# Patient Record
Sex: MALE
[De-identification: names, ages, dates, MRNs, and addresses within clinical notes are randomized per-mention and may not be internally consistent; named-entity substitution may affect disease eponyms.]

---

## 2019-04-01 ENCOUNTER — HOSPITAL ENCOUNTER (OUTPATIENT)
Dept: HOSPITAL 90 - SHCH | Age: 74
Discharge: HOME | End: 2019-04-01
Attending: INTERNAL MEDICINE
Payer: MEDICARE

## 2019-04-01 DIAGNOSIS — I34.0: Primary | ICD-10-CM

## 2019-04-01 DIAGNOSIS — I48.0: ICD-10-CM

## 2019-04-01 DIAGNOSIS — I48.92: ICD-10-CM

## 2019-04-01 PROCEDURE — 93306 TTE W/DOPPLER COMPLETE: CPT

## 2019-07-26 ENCOUNTER — HOSPITAL ENCOUNTER (OUTPATIENT)
Dept: HOSPITAL 90 - SHCH | Age: 74
Discharge: HOME | End: 2019-07-26
Attending: INTERNAL MEDICINE
Payer: MEDICARE

## 2019-07-26 DIAGNOSIS — I48.0: ICD-10-CM

## 2019-07-26 DIAGNOSIS — I08.1: Primary | ICD-10-CM

## 2019-07-26 PROCEDURE — 93306 TTE W/DOPPLER COMPLETE: CPT

## 2019-08-27 ENCOUNTER — HOSPITAL ENCOUNTER (OUTPATIENT)
Dept: HOSPITAL 90 - SHCH | Age: 74
Discharge: HOME | End: 2019-08-27
Attending: INTERNAL MEDICINE
Payer: MEDICARE

## 2019-08-27 VITALS — BODY MASS INDEX: 14.81 KG/M2 | WEIGHT: 100 LBS | HEIGHT: 69 IN

## 2019-08-27 DIAGNOSIS — I21.29: Primary | ICD-10-CM

## 2019-08-27 DIAGNOSIS — I25.10: ICD-10-CM

## 2019-08-27 PROCEDURE — 93017 CV STRESS TEST TRACING ONLY: CPT

## 2019-08-27 PROCEDURE — 78452 HT MUSCLE IMAGE SPECT MULT: CPT

## 2019-08-27 PROCEDURE — 96374 THER/PROPH/DIAG INJ IV PUSH: CPT

## 2019-09-18 ENCOUNTER — HOSPITAL ENCOUNTER (INPATIENT)
Dept: HOSPITAL 90 - DAH | Age: 74
LOS: 3 days | Discharge: HOME | End: 2019-09-21
Payer: MEDICARE

## 2019-09-18 DIAGNOSIS — J95.811: ICD-10-CM

## 2019-09-18 DIAGNOSIS — I25.5: ICD-10-CM

## 2019-09-18 DIAGNOSIS — Z95.810: ICD-10-CM

## 2019-09-18 DIAGNOSIS — I11.0: ICD-10-CM

## 2019-09-18 DIAGNOSIS — I50.22: Primary | ICD-10-CM

## 2019-09-18 DIAGNOSIS — Z79.01: ICD-10-CM

## 2019-09-18 DIAGNOSIS — J93.0: ICD-10-CM

## 2019-09-18 DIAGNOSIS — R64: ICD-10-CM

## 2019-09-18 DIAGNOSIS — J44.9: ICD-10-CM

## 2019-09-23 ENCOUNTER — HOSPITAL ENCOUNTER (INPATIENT)
Dept: HOSPITAL 90 - EDH | Age: 74
LOS: 7 days | Discharge: HOME | DRG: 199 | End: 2019-09-30
Attending: INTERNAL MEDICINE | Admitting: INTERNAL MEDICINE
Payer: MEDICARE

## 2019-09-23 VITALS — WEIGHT: 105.1 LBS | BODY MASS INDEX: 15.93 KG/M2 | HEIGHT: 68 IN

## 2019-09-23 DIAGNOSIS — I50.20: ICD-10-CM

## 2019-09-23 DIAGNOSIS — F17.200: ICD-10-CM

## 2019-09-23 DIAGNOSIS — J93.0: Primary | ICD-10-CM

## 2019-09-23 DIAGNOSIS — Z87.442: ICD-10-CM

## 2019-09-23 DIAGNOSIS — I95.9: ICD-10-CM

## 2019-09-23 DIAGNOSIS — E78.5: ICD-10-CM
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
## 2019-09-23 DIAGNOSIS — I49.5: ICD-10-CM

## 2019-09-23 DIAGNOSIS — I11.0: ICD-10-CM

## 2019-09-23 DIAGNOSIS — E43: ICD-10-CM

## 2019-09-23 DIAGNOSIS — Z95.0: ICD-10-CM

## 2019-09-23 DIAGNOSIS — I25.2: ICD-10-CM

## 2019-09-23 LAB
ALBUMIN SERPL-MCNC: 3.7 G/DL (ref 3.5–5)
ALT SERPL-CCNC: 22 U/L (ref 12–78)
APTT PPP: 28.2 SEC (ref 26.3–35.5)
AST SERPL-CCNC: 30 U/L (ref 10–37)
BASOPHILS NFR BLD AUTO: 1.3 % (ref 0–5)
BILIRUB SERPL-MCNC: 0.7 MG/DL (ref 0.2–1)
BNP SERPL-MCNC: 121 PG/ML (ref 0–100)
BUN SERPL-MCNC: 20 MG/DL (ref 7–18)
CHLORIDE SERPL-SCNC: 102 MMOL/L (ref 101–111)
CK SERPL-CCNC: 65 U/L (ref 21–232)
CO2 SERPL-SCNC: 31 MMOL/L (ref 21–32)
CREAT SERPL-MCNC: 1 MG/DL (ref 0.5–1.5)
EOSINOPHIL NFR BLD AUTO: 4.8 % (ref 0–8)
ERYTHROCYTE [DISTWIDTH] IN BLOOD BY AUTOMATED COUNT: 13.9 % (ref 11–15.5)
GFR SERPL CREATININE-BSD FRML MDRD: 78 ML/MIN (ref 60–?)
GLUCOSE SERPL-MCNC: 102 MG/DL (ref 70–105)
HCT VFR BLD AUTO: 42 % (ref 42–54)
INR PPP: 1.02 (ref 0.85–1.15)
LYMPHOCYTES NFR SPEC AUTO: 21.1 % (ref 21–51)
MCH RBC QN AUTO: 32.7 PG (ref 27–33)
MCHC RBC AUTO-ENTMCNC: 33.8 G/DL (ref 32–36)
MCV RBC AUTO: 96.7 FL (ref 79–99)
MONOCYTES NFR BLD AUTO: 7.5 % (ref 3–13)
NEUTROPHILS NFR BLD AUTO: 65.3 % (ref 40–77)
NRBC BLD MANUAL-RTO: 0 % (ref 0–0.19)
PLATELET # BLD AUTO: 124 K/UL (ref 130–400)
POTASSIUM SERPL-SCNC: 3.7 MMOL/L (ref 3.5–5.1)
PROT SERPL-MCNC: 7 G/DL (ref 6–8.3)
PROTHROMBIN TIME: 10.7 SEC (ref 9.6–11.6)
RBC # BLD AUTO: 4.35 MIL/UL (ref 4.5–6.2)
SODIUM SERPL-SCNC: 143 MMOL/L (ref 136–145)
WBC # BLD AUTO: 10 K/UL (ref 4.8–10.8)

## 2019-09-23 PROCEDURE — 94664 DEMO&/EVAL PT USE INHALER: CPT

## 2019-09-23 PROCEDURE — 85610 PROTHROMBIN TIME: CPT

## 2019-09-23 PROCEDURE — 85730 THROMBOPLASTIN TIME PARTIAL: CPT

## 2019-09-23 PROCEDURE — 82550 ASSAY OF CK (CPK): CPT

## 2019-09-23 PROCEDURE — 85027 COMPLETE CBC AUTOMATED: CPT

## 2019-09-23 PROCEDURE — 83880 ASSAY OF NATRIURETIC PEPTIDE: CPT

## 2019-09-23 PROCEDURE — 71045 X-RAY EXAM CHEST 1 VIEW: CPT

## 2019-09-23 PROCEDURE — 80053 COMPREHEN METABOLIC PANEL: CPT

## 2019-09-23 PROCEDURE — 94640 AIRWAY INHALATION TREATMENT: CPT

## 2019-09-23 PROCEDURE — 36415 COLL VENOUS BLD VENIPUNCTURE: CPT

## 2019-09-23 PROCEDURE — 97039 UNLISTED MODALITY: CPT

## 2019-09-23 PROCEDURE — 84484 ASSAY OF TROPONIN QUANT: CPT

## 2019-09-23 PROCEDURE — 85025 COMPLETE CBC W/AUTO DIFF WBC: CPT

## 2019-09-23 PROCEDURE — 80048 BASIC METABOLIC PNL TOTAL CA: CPT

## 2019-09-23 PROCEDURE — 71250 CT THORAX DX C-: CPT

## 2019-09-23 PROCEDURE — 93005 ELECTROCARDIOGRAM TRACING: CPT

## 2019-09-23 PROCEDURE — 94760 N-INVAS EAR/PLS OXIMETRY 1: CPT

## 2019-09-24 LAB
BUN SERPL-MCNC: 20 MG/DL (ref 7–18)
CHLORIDE SERPL-SCNC: 108 MMOL/L (ref 101–111)
CO2 SERPL-SCNC: 29 MMOL/L (ref 21–32)
CREAT SERPL-MCNC: 0.8 MG/DL (ref 0.5–1.5)
GFR SERPL CREATININE-BSD FRML MDRD: 100 ML/MIN (ref 60–?)
GLUCOSE SERPL-MCNC: 116 MG/DL (ref 70–105)
POTASSIUM SERPL-SCNC: 4 MMOL/L (ref 3.5–5.1)
SODIUM SERPL-SCNC: 145 MMOL/L (ref 136–145)

## 2019-09-24 RX ADMIN — IPRATROPIUM BROMIDE AND ALBUTEROL SULFATE SCH UDVIAL: .5; 3 SOLUTION RESPIRATORY (INHALATION) at 18:45

## 2019-09-24 RX ADMIN — IPRATROPIUM BROMIDE AND ALBUTEROL SULFATE SCH UDVIAL: .5; 3 SOLUTION RESPIRATORY (INHALATION) at 21:32

## 2019-09-24 RX ADMIN — IPRATROPIUM BROMIDE AND ALBUTEROL SULFATE SCH UDVIAL: .5; 3 SOLUTION RESPIRATORY (INHALATION) at 15:03

## 2019-09-24 RX ADMIN — SODIUM CHLORIDE SCH GM: 9 INJECTION, SOLUTION INTRAVENOUS at 04:00

## 2019-09-24 RX ADMIN — APIXABAN SCH MG: 5 TABLET, FILM COATED ORAL at 21:00

## 2019-09-24 RX ADMIN — CARVEDILOL SCH MG: 3.12 TABLET, FILM COATED ORAL at 21:00

## 2019-09-24 RX ADMIN — CHLORTHALIDONE SCH EACH: 50 TABLET ORAL at 21:00

## 2019-09-24 RX ADMIN — FAMOTIDINE SCH MG: 20 TABLET, FILM COATED ORAL at 21:00

## 2019-09-24 RX ADMIN — ATORVASTATIN CALCIUM SCH MG: 20 TABLET, FILM COATED ORAL at 21:00

## 2019-09-25 VITALS — DIASTOLIC BLOOD PRESSURE: 53 MMHG | SYSTOLIC BLOOD PRESSURE: 100 MMHG

## 2019-09-25 VITALS — SYSTOLIC BLOOD PRESSURE: 103 MMHG | DIASTOLIC BLOOD PRESSURE: 52 MMHG

## 2019-09-25 VITALS — DIASTOLIC BLOOD PRESSURE: 59 MMHG | SYSTOLIC BLOOD PRESSURE: 82 MMHG

## 2019-09-25 VITALS — SYSTOLIC BLOOD PRESSURE: 81 MMHG | DIASTOLIC BLOOD PRESSURE: 51 MMHG

## 2019-09-25 VITALS — DIASTOLIC BLOOD PRESSURE: 54 MMHG | SYSTOLIC BLOOD PRESSURE: 84 MMHG

## 2019-09-25 VITALS — DIASTOLIC BLOOD PRESSURE: 54 MMHG | SYSTOLIC BLOOD PRESSURE: 110 MMHG

## 2019-09-25 VITALS — SYSTOLIC BLOOD PRESSURE: 94 MMHG | DIASTOLIC BLOOD PRESSURE: 60 MMHG

## 2019-09-25 LAB
ALBUMIN SERPL-MCNC: 2.7 G/DL (ref 3.5–5)
ALT SERPL-CCNC: 17 U/L (ref 12–78)
AST SERPL-CCNC: 18 U/L (ref 10–37)
BASOPHILS NFR BLD AUTO: 0.9 % (ref 0–5)
BILIRUB SERPL-MCNC: 0.6 MG/DL (ref 0.2–1)
BUN SERPL-MCNC: 15 MG/DL (ref 7–18)
CHLORIDE SERPL-SCNC: 109 MMOL/L (ref 101–111)
CO2 SERPL-SCNC: 29 MMOL/L (ref 21–32)
CREAT SERPL-MCNC: 0.8 MG/DL (ref 0.5–1.5)
EOSINOPHIL NFR BLD AUTO: 3.7 % (ref 0–8)
ERYTHROCYTE [DISTWIDTH] IN BLOOD BY AUTOMATED COUNT: 13.6 % (ref 11–15.5)
ERYTHROCYTE [DISTWIDTH] IN BLOOD BY AUTOMATED COUNT: 13.7 % (ref 11–15.5)
GFR SERPL CREATININE-BSD FRML MDRD: 100 ML/MIN (ref 60–?)
GLUCOSE SERPL-MCNC: 112 MG/DL (ref 70–105)
HCT VFR BLD AUTO: 33.7 % (ref 42–54)
HCT VFR BLD AUTO: 34.1 % (ref 42–54)
LYMPHOCYTES NFR SPEC AUTO: 17.9 % (ref 21–51)
MCH RBC QN AUTO: 32.8 PG (ref 27–33)
MCH RBC QN AUTO: 33.2 PG (ref 27–33)
MCHC RBC AUTO-ENTMCNC: 33.7 G/DL (ref 32–36)
MCHC RBC AUTO-ENTMCNC: 33.7 G/DL (ref 32–36)
MCV RBC AUTO: 97.3 FL (ref 79–99)
MCV RBC AUTO: 98.5 FL (ref 79–99)
MONOCYTES NFR BLD AUTO: 9.3 % (ref 3–13)
NEUTROPHILS NFR BLD AUTO: 68.2 % (ref 40–77)
NRBC BLD MANUAL-RTO: 0 % (ref 0–0.19)
NRBC BLD MANUAL-RTO: 0 % (ref 0–0.19)
PLATELET # BLD AUTO: 103 K/UL (ref 130–400)
PLATELET # BLD AUTO: 111 K/UL (ref 130–400)
POTASSIUM SERPL-SCNC: 3.9 MMOL/L (ref 3.5–5.1)
PROT SERPL-MCNC: 5.5 G/DL (ref 6–8.3)
RBC # BLD AUTO: 3.46 MIL/UL (ref 4.5–6.2)
RBC # BLD AUTO: 3.46 MIL/UL (ref 4.5–6.2)
SODIUM SERPL-SCNC: 144 MMOL/L (ref 136–145)
WBC # BLD AUTO: 8 K/UL (ref 4.8–10.8)
WBC # BLD AUTO: 8.1 K/UL (ref 4.8–10.8)

## 2019-09-25 RX ADMIN — ATORVASTATIN CALCIUM SCH MG: 20 TABLET, FILM COATED ORAL at 20:56

## 2019-09-25 RX ADMIN — CHLORTHALIDONE SCH EACH: 50 TABLET ORAL at 21:00

## 2019-09-25 RX ADMIN — FAMOTIDINE SCH MG: 20 TABLET, FILM COATED ORAL at 09:21

## 2019-09-25 RX ADMIN — Medication SCH TAB: at 09:17

## 2019-09-25 RX ADMIN — APIXABAN SCH MG: 5 TABLET, FILM COATED ORAL at 20:57

## 2019-09-25 RX ADMIN — IPRATROPIUM BROMIDE AND ALBUTEROL SULFATE SCH UDVIAL: .5; 3 SOLUTION RESPIRATORY (INHALATION) at 04:57

## 2019-09-25 RX ADMIN — IPRATROPIUM BROMIDE AND ALBUTEROL SULFATE SCH UDVIAL: .5; 3 SOLUTION RESPIRATORY (INHALATION) at 02:01

## 2019-09-25 RX ADMIN — CARVEDILOL SCH MG: 3.12 TABLET, FILM COATED ORAL at 21:00

## 2019-09-25 RX ADMIN — APIXABAN SCH MG: 5 TABLET, FILM COATED ORAL at 09:22

## 2019-09-25 RX ADMIN — SODIUM CHLORIDE SCH GM: 9 INJECTION, SOLUTION INTRAVENOUS at 04:13

## 2019-09-25 RX ADMIN — IPRATROPIUM BROMIDE AND ALBUTEROL SULFATE SCH UDVIAL: .5; 3 SOLUTION RESPIRATORY (INHALATION) at 19:23

## 2019-09-25 RX ADMIN — FAMOTIDINE SCH MG: 20 TABLET, FILM COATED ORAL at 20:56

## 2019-09-25 RX ADMIN — MORPHINE SULFATE PRN MG: 2 INJECTION, SOLUTION INTRAMUSCULAR; INTRAVENOUS at 02:18

## 2019-09-25 RX ADMIN — IPRATROPIUM BROMIDE AND ALBUTEROL SULFATE SCH UDVIAL: .5; 3 SOLUTION RESPIRATORY (INHALATION) at 23:51

## 2019-09-25 RX ADMIN — IPRATROPIUM BROMIDE AND ALBUTEROL SULFATE SCH UDVIAL: .5; 3 SOLUTION RESPIRATORY (INHALATION) at 09:49

## 2019-09-25 RX ADMIN — CARVEDILOL SCH MG: 3.12 TABLET, FILM COATED ORAL at 09:00

## 2019-09-25 RX ADMIN — CHLORTHALIDONE SCH EACH: 50 TABLET ORAL at 16:02

## 2019-09-25 NOTE — NUR
MD VISIT

DR RAMON FUNG & FALLON LUCAS NP IN TO SEE PT.  LT CHEST TUBE TO H2O SEAL, SUCTION DC'D BY MD.  
ORDERS RECEIVED & ENTERED.

## 2019-09-25 NOTE — NUR
DCP

CM met with pt discussed dc plans. Pt is independent prior to admission, lives at home with 
spouse. Has a provider 20hrs/wk, shower chair, cane. Denies any other equipments/services. 
Pt feels safe to go back home, daughter able to assist with transportation and needs as 
necessary. DC plan to home once stable. CM to cont to follow up.

-------------------------------------------------------------------------------

Addendum: 09/25/19 at 1620 by RIMA STANLEY LVN CM

-------------------------------------------------------------------------------

Amended: Links added.

## 2019-09-25 NOTE — NUR
AM ASSESSMENT

PT LAYING IN BED, HOB ELEVATED 30 DEGREES, RESTING.  FAMILY @ BEDSIDE.  A/O X 3.  SOB ON 
EXERTION.  NO DISTRESS NOTED.  O2 NC @ 2L.  DENIES CHEST PAIN OR DISCOMFORT.  DENIES 
PALPITATIONS.  DENIES INCISIONAL PAIN.  LT UPPER CHEST DSG DRY & INTACT.  NO DRAINAGE NOTED. 
 NO HEMATOMA NOTED.  PPM ARM PRECAUTIONS REINFORCED.  LT LATERAL CHEST TUBE TO SUCTION.  NO 
AIR LEAK NOTED.  DSG DRY & INTACT.  DENIES N/V AND/OR DIARRHEA.  BR W/BRP.  INSTRUCTED TO 
CALL FOR ASSISTANCE.  CALL BELL W/IN REACH.

## 2019-09-26 VITALS — DIASTOLIC BLOOD PRESSURE: 63 MMHG | SYSTOLIC BLOOD PRESSURE: 104 MMHG

## 2019-09-26 VITALS — SYSTOLIC BLOOD PRESSURE: 102 MMHG | DIASTOLIC BLOOD PRESSURE: 52 MMHG

## 2019-09-26 VITALS — SYSTOLIC BLOOD PRESSURE: 106 MMHG | DIASTOLIC BLOOD PRESSURE: 65 MMHG

## 2019-09-26 VITALS — SYSTOLIC BLOOD PRESSURE: 121 MMHG | DIASTOLIC BLOOD PRESSURE: 48 MMHG

## 2019-09-26 VITALS — SYSTOLIC BLOOD PRESSURE: 122 MMHG | DIASTOLIC BLOOD PRESSURE: 62 MMHG

## 2019-09-26 VITALS — DIASTOLIC BLOOD PRESSURE: 60 MMHG | SYSTOLIC BLOOD PRESSURE: 102 MMHG

## 2019-09-26 LAB
BUN SERPL-MCNC: 13 MG/DL (ref 7–18)
CHLORIDE SERPL-SCNC: 106 MMOL/L (ref 101–111)
CO2 SERPL-SCNC: 28 MMOL/L (ref 21–32)
CREAT SERPL-MCNC: 0.9 MG/DL (ref 0.5–1.5)
GFR SERPL CREATININE-BSD FRML MDRD: 88 ML/MIN (ref 60–?)
GLUCOSE SERPL-MCNC: 107 MG/DL (ref 70–105)
POTASSIUM SERPL-SCNC: 3.4 MMOL/L (ref 3.5–5.1)
SODIUM SERPL-SCNC: 142 MMOL/L (ref 136–145)

## 2019-09-26 RX ADMIN — APIXABAN SCH MG: 5 TABLET, FILM COATED ORAL at 20:28

## 2019-09-26 RX ADMIN — APIXABAN SCH MG: 5 TABLET, FILM COATED ORAL at 08:43

## 2019-09-26 RX ADMIN — SODIUM CHLORIDE SCH GM: 9 INJECTION, SOLUTION INTRAVENOUS at 05:32

## 2019-09-26 RX ADMIN — IPRATROPIUM BROMIDE AND ALBUTEROL SULFATE SCH UDVIAL: .5; 3 SOLUTION RESPIRATORY (INHALATION) at 18:27

## 2019-09-26 RX ADMIN — CARVEDILOL SCH MG: 3.12 TABLET, FILM COATED ORAL at 08:44

## 2019-09-26 RX ADMIN — CHLORTHALIDONE SCH EACH: 50 TABLET ORAL at 08:44

## 2019-09-26 RX ADMIN — FAMOTIDINE SCH MG: 20 TABLET, FILM COATED ORAL at 20:28

## 2019-09-26 RX ADMIN — FAMOTIDINE SCH MG: 20 TABLET, FILM COATED ORAL at 08:43

## 2019-09-26 RX ADMIN — IPRATROPIUM BROMIDE AND ALBUTEROL SULFATE SCH UDVIAL: .5; 3 SOLUTION RESPIRATORY (INHALATION) at 23:52

## 2019-09-26 RX ADMIN — IPRATROPIUM BROMIDE AND ALBUTEROL SULFATE SCH UDVIAL: .5; 3 SOLUTION RESPIRATORY (INHALATION) at 11:04

## 2019-09-26 RX ADMIN — ATORVASTATIN CALCIUM SCH MG: 20 TABLET, FILM COATED ORAL at 20:32

## 2019-09-26 RX ADMIN — CARVEDILOL SCH MG: 3.12 TABLET, FILM COATED ORAL at 20:32

## 2019-09-26 RX ADMIN — Medication SCH TAB: at 08:43

## 2019-09-26 RX ADMIN — MORPHINE SULFATE PRN MG: 2 INJECTION, SOLUTION INTRAMUSCULAR; INTRAVENOUS at 23:45

## 2019-09-26 RX ADMIN — IPRATROPIUM BROMIDE AND ALBUTEROL SULFATE SCH UDVIAL: .5; 3 SOLUTION RESPIRATORY (INHALATION) at 05:02

## 2019-09-26 RX ADMIN — CHLORTHALIDONE SCH EACH: 50 TABLET ORAL at 20:29

## 2019-09-26 NOTE — NUR
TRANSFER

PT TRANSFERRED TO  VIA BED.  FAMILY NOTIFIED.  PT TOLERATED TRANSFER WELL.  NO 
DISTRESS NOTED.  BEDSIDE REPORT GIVEN TO DOMENICA GUTIERREZ RN.

## 2019-09-26 NOTE — NUR
AM ASSESSMENT

PT LAYING IN BED, HOB ELEVATED 30 DEGREES, RESTING.  SPOUSE @ BEDSIDE.  A/O X 3.  SOB ON 
EXERTION.  NO DISTRESS NOTED.  O2 NC @ 2L.  DENIES CHEST PAIN OR DISCOMFORT.  DENIES 
INCISIONAL PAIN.  DENIES PAIN OT CHEST TUBE PUNCTURE SITE.  TELE: PACED 90s.  LT UPPER CHEST 
INCISION, DRY & INTACT.  NO DRAINAGE NOTED.  S/P PPM 09/19/19.  PPM ARM PRECAUTIONS 
REINFORCED.  LT LATERAL CHEST TUBE TO H20 SEAL.  NO AIR LEAK NOTED.  DENIES N/V AND/OR 
DIARRHEA.  BEDREST W/BRP.  INSTRUCTED TO CALL FOR ASSISTANCE.  CALL BELL W/IN REACH.

## 2019-09-26 NOTE — NUR
BOWEL MOVEMENT

PT ASKING FOR BEDSIDE COMMODE TO HAVE BM.  PT ATTEMPTED TO GET OOB W/HELP FROM SPOUSE.  PER 
PT, TOO WEAK TO GET UP AND REQUESTED BEDPAN.  PCP, NANI, OFFERED TO PLACE PT ON BEDPAN. PT 
REFUSED PCPs HELP, STATED WIFE WOULD ASSIST HIM.  PT UNABLE TO BE PLACED ON BEDPAN BY 
SPOUSE.  EXPLAINED TO PT STAFF WILL ASSIST TO GET OOB AND/OR BEDPAN SINCE LT CHEST TUBE IN 
PLACE & RECENT PPM PLACEMENT. INFORMED STAFF WOULD ASSIST GET OOB & WALK TO RESTRM BUT 
WOULDN'T BE PRESENT IN RESTRM WHEN PT ATTEMPTS TO HAVE BM.  PT PREFERS NOT HAVE BM AND NOT 
BE ASSISTED BY STAFF TO RESTRM, BEDSIDE COMMODE, OR BEDPAN.  REINFORCE SAFETY CONCERNS AND 
REASON FOR STAFF'S ASSISTANCE.  PT CONTINUES TO REFUSE ASSISTANCE AND STATES NOT HAVING TO 
HAVE A BM.  PROCEEDS TO EXPLAIN HE HAS BEEN EATING VERY LITTLE AND DOESN'T HAVE NEED FOR BM. 
 ABDOMEN SOFT, NON-TENDER.  DENIES ABDOMINAL PAIN.  REMINDED PT LAST BM WAS 09/21/19.  PT 
CONTINUES TO REFUSE ASSISTANCE FROM STAFF.  SIMILAR SCENARIO HAPPENED W/SPOUSE PESENT 
REGARDING BM ON 09/25/19.  ARM PRECAUTIONS & SAFETY REINFORCED.

## 2019-09-27 VITALS — DIASTOLIC BLOOD PRESSURE: 70 MMHG | SYSTOLIC BLOOD PRESSURE: 119 MMHG

## 2019-09-27 VITALS — DIASTOLIC BLOOD PRESSURE: 54 MMHG | SYSTOLIC BLOOD PRESSURE: 96 MMHG

## 2019-09-27 VITALS — SYSTOLIC BLOOD PRESSURE: 92 MMHG | DIASTOLIC BLOOD PRESSURE: 42 MMHG

## 2019-09-27 VITALS — DIASTOLIC BLOOD PRESSURE: 63 MMHG | SYSTOLIC BLOOD PRESSURE: 97 MMHG

## 2019-09-27 VITALS — DIASTOLIC BLOOD PRESSURE: 62 MMHG | SYSTOLIC BLOOD PRESSURE: 113 MMHG

## 2019-09-27 VITALS — DIASTOLIC BLOOD PRESSURE: 50 MMHG | SYSTOLIC BLOOD PRESSURE: 90 MMHG

## 2019-09-27 RX ADMIN — APIXABAN SCH MG: 5 TABLET, FILM COATED ORAL at 21:47

## 2019-09-27 RX ADMIN — APIXABAN SCH MG: 5 TABLET, FILM COATED ORAL at 10:46

## 2019-09-27 RX ADMIN — FAMOTIDINE SCH MG: 20 TABLET, FILM COATED ORAL at 21:47

## 2019-09-27 RX ADMIN — IPRATROPIUM BROMIDE AND ALBUTEROL SULFATE SCH UDVIAL: .5; 3 SOLUTION RESPIRATORY (INHALATION) at 06:17

## 2019-09-27 RX ADMIN — IPRATROPIUM BROMIDE AND ALBUTEROL SULFATE SCH UDVIAL: .5; 3 SOLUTION RESPIRATORY (INHALATION) at 23:03

## 2019-09-27 RX ADMIN — CHLORTHALIDONE SCH EACH: 50 TABLET ORAL at 09:00

## 2019-09-27 RX ADMIN — Medication SCH TAB: at 11:49

## 2019-09-27 RX ADMIN — SODIUM CHLORIDE SCH GM: 9 INJECTION, SOLUTION INTRAVENOUS at 04:09

## 2019-09-27 RX ADMIN — ATORVASTATIN CALCIUM SCH MG: 20 TABLET, FILM COATED ORAL at 21:46

## 2019-09-27 RX ADMIN — CARVEDILOL SCH MG: 3.12 TABLET, FILM COATED ORAL at 21:00

## 2019-09-27 RX ADMIN — IPRATROPIUM BROMIDE AND ALBUTEROL SULFATE SCH UDVIAL: .5; 3 SOLUTION RESPIRATORY (INHALATION) at 18:41

## 2019-09-27 RX ADMIN — CARVEDILOL SCH MG: 3.12 TABLET, FILM COATED ORAL at 17:41

## 2019-09-27 RX ADMIN — FAMOTIDINE SCH MG: 20 TABLET, FILM COATED ORAL at 10:45

## 2019-09-27 RX ADMIN — IPRATROPIUM BROMIDE AND ALBUTEROL SULFATE SCH UDVIAL: .5; 3 SOLUTION RESPIRATORY (INHALATION) at 11:00

## 2019-09-27 RX ADMIN — CHLORTHALIDONE SCH EACH: 50 TABLET ORAL at 21:00

## 2019-09-27 RX ADMIN — CARVEDILOL SCH MG: 3.12 TABLET, FILM COATED ORAL at 21:47

## 2019-09-27 NOTE — NUR
Chest Tube



Removed chest tube as per MD orders.  No distress noted, denies any shortness of breath, 
auscultation to bilateral lobes diminished breath sounds.  Patient stable.  Post chest tube 
removal X-ray completed pending results.

## 2019-09-27 NOTE — NUR
DC PLAN



SPOKE TO PATIENT AND FAMILY. DECLINED SNF. SAID HE WILL GET MORE PT AT HOME THAN AT A 
FACILITY. TOLD HIM IT WAS ALSO FOR O2. SAID HE ALREADY HAS 02 AT HOME THAT CHILDREN 
PURCHASED FOR HIM. 

-------------------------------------------------------------------------------

Addendum: 09/27/19 at 1802 by PAVAN CORDERO RN CM

-------------------------------------------------------------------------------

Amended: Links added.

## 2019-09-28 VITALS — SYSTOLIC BLOOD PRESSURE: 108 MMHG | DIASTOLIC BLOOD PRESSURE: 68 MMHG

## 2019-09-28 VITALS — SYSTOLIC BLOOD PRESSURE: 93 MMHG | DIASTOLIC BLOOD PRESSURE: 59 MMHG

## 2019-09-28 VITALS — SYSTOLIC BLOOD PRESSURE: 99 MMHG | DIASTOLIC BLOOD PRESSURE: 66 MMHG

## 2019-09-28 VITALS — DIASTOLIC BLOOD PRESSURE: 53 MMHG | SYSTOLIC BLOOD PRESSURE: 95 MMHG

## 2019-09-28 VITALS — SYSTOLIC BLOOD PRESSURE: 101 MMHG | DIASTOLIC BLOOD PRESSURE: 52 MMHG

## 2019-09-28 VITALS — DIASTOLIC BLOOD PRESSURE: 70 MMHG | SYSTOLIC BLOOD PRESSURE: 114 MMHG

## 2019-09-28 RX ADMIN — APIXABAN SCH MG: 5 TABLET, FILM COATED ORAL at 14:55

## 2019-09-28 RX ADMIN — SODIUM CHLORIDE SCH GM: 9 INJECTION, SOLUTION INTRAVENOUS at 06:07

## 2019-09-28 RX ADMIN — FAMOTIDINE SCH MG: 20 TABLET, FILM COATED ORAL at 07:54

## 2019-09-28 RX ADMIN — CARVEDILOL SCH MG: 3.12 TABLET, FILM COATED ORAL at 07:52

## 2019-09-28 RX ADMIN — CHLORTHALIDONE SCH EACH: 50 TABLET ORAL at 07:54

## 2019-09-28 RX ADMIN — CARVEDILOL SCH MG: 3.12 TABLET, FILM COATED ORAL at 20:47

## 2019-09-28 RX ADMIN — Medication SCH TAB: at 07:54

## 2019-09-28 RX ADMIN — IPRATROPIUM BROMIDE AND ALBUTEROL SULFATE SCH UDVIAL: .5; 3 SOLUTION RESPIRATORY (INHALATION) at 06:28

## 2019-09-28 RX ADMIN — IPRATROPIUM BROMIDE AND ALBUTEROL SULFATE SCH UDVIAL: .5; 3 SOLUTION RESPIRATORY (INHALATION) at 10:53

## 2019-09-28 RX ADMIN — ATORVASTATIN CALCIUM SCH MG: 20 TABLET, FILM COATED ORAL at 20:47

## 2019-09-28 RX ADMIN — IPRATROPIUM BROMIDE AND ALBUTEROL SULFATE SCH UDVIAL: .5; 3 SOLUTION RESPIRATORY (INHALATION) at 23:37

## 2019-09-28 RX ADMIN — FAMOTIDINE SCH MG: 20 TABLET, FILM COATED ORAL at 20:47

## 2019-09-28 RX ADMIN — IPRATROPIUM BROMIDE AND ALBUTEROL SULFATE SCH UDVIAL: .5; 3 SOLUTION RESPIRATORY (INHALATION) at 18:30

## 2019-09-28 RX ADMIN — APIXABAN SCH MG: 5 TABLET, FILM COATED ORAL at 07:53

## 2019-09-28 RX ADMIN — CHLORTHALIDONE SCH EACH: 50 TABLET ORAL at 21:00

## 2019-09-28 NOTE — NUR
ASSESSMENT

PT IS AAOX4 DENIES CP DENIES SOB DENIES NV NO COMPLAINTS AT THIS TIME. AM MEDS GIVEN, 
TOLERATED WELL.SITTING UPRIGHT IN BED.. CALL LIGHT WITHIN REACH. FAMILY AT BEDSIDE.

## 2019-09-28 NOTE — NUR
DR FITZGERALD ROUNDED

SPOKE WITH PATIENT REGARDING IR TUBE PLACEMENT, AND SURGEON INTERVENTION / OPINION FOR 
RECURRENT PNEUMOS. PATIENT AGREEABLE TO A SURGICAL OPINION. PATIENT ALSO SAYING IF THINGS 
WERE TO NOT GO WELL FOR HIM, HE'D LIKE TO BE MADE COMFORTABLE, AND BE HOME AS LAST RESORT. 
DR FITZGERALD AGREEABLE FOR HOSPICE CONSULTATION FOR PATIENT IF PATIENT FEELS HE DOESNT WANT TO 
PROCEED WITH ANY TREATMENT FOR RECURRENT PNEUMOS. PATIENT AND FAMILY TO DISCUSS AMONGST 
THEMSELVES.

## 2019-09-28 NOTE — NUR
DR GOMEZ REPORTED TO ME PNEUMO 20-40% LEFT SIDE

THESE RESULTS REPORTED TO COCO DOE NP, AWAITING FURTHER ORDERS

## 2019-09-28 NOTE — NUR
RADIOLOGY STAFF CALLED TO FLOOR

STATE THEY WILL PLACE PIGTAIL TOMORROW 9AM, [facility] TO BE ON HOLD TONIGHT AND TOMORROW AM 
PRIOR TO PIGTAIL. PATIENT MADE AWARE AND AGREES WITH PLAN.

## 2019-09-28 NOTE — NUR
COCO DOE MADE AWARE OF PTS REFUSAL

STATES SHE WILL LET DR FITZGERALD KNOW, WAITING FOR DR FITZGERALD TO ROUND

## 2019-09-28 NOTE — NUR
ORDERS RECEIVED FOR IR TO PLACE PIGTAIL

PATIENT IS REFUSING PIGTAIL PLACEMENT,STATES HE DOESN'T WANT IT AGAIN. WILL LET 
PULMONOLOGIST KNOW.

## 2019-09-29 VITALS — SYSTOLIC BLOOD PRESSURE: 103 MMHG | DIASTOLIC BLOOD PRESSURE: 52 MMHG

## 2019-09-29 VITALS — SYSTOLIC BLOOD PRESSURE: 90 MMHG | DIASTOLIC BLOOD PRESSURE: 56 MMHG

## 2019-09-29 VITALS — DIASTOLIC BLOOD PRESSURE: 65 MMHG | SYSTOLIC BLOOD PRESSURE: 155 MMHG

## 2019-09-29 VITALS — DIASTOLIC BLOOD PRESSURE: 59 MMHG | SYSTOLIC BLOOD PRESSURE: 93 MMHG

## 2019-09-29 VITALS — DIASTOLIC BLOOD PRESSURE: 59 MMHG | SYSTOLIC BLOOD PRESSURE: 95 MMHG

## 2019-09-29 LAB
APTT PPP: 29 SEC (ref 26.3–35.5)
BUN SERPL-MCNC: 8 MG/DL (ref 7–18)
CHLORIDE SERPL-SCNC: 107 MMOL/L (ref 101–111)
CO2 SERPL-SCNC: 31 MMOL/L (ref 21–32)
CREAT SERPL-MCNC: 0.8 MG/DL (ref 0.5–1.5)
ERYTHROCYTE [DISTWIDTH] IN BLOOD BY AUTOMATED COUNT: 13.4 % (ref 11–15.5)
GFR SERPL CREATININE-BSD FRML MDRD: 100 ML/MIN (ref 60–?)
GLUCOSE SERPL-MCNC: 104 MG/DL (ref 70–105)
HCT VFR BLD AUTO: 32.2 % (ref 42–54)
INR PPP: 1.06 (ref 0.85–1.15)
MCH RBC QN AUTO: 33.9 PG (ref 27–33)
MCHC RBC AUTO-ENTMCNC: 34.9 G/DL (ref 32–36)
MCV RBC AUTO: 97.3 FL (ref 79–99)
NRBC BLD MANUAL-RTO: 0 % (ref 0–0.19)
PLATELET # BLD AUTO: 170 K/UL (ref 130–400)
POTASSIUM SERPL-SCNC: 3.6 MMOL/L (ref 3.5–5.1)
PROTHROMBIN TIME: 11.1 SEC (ref 9.6–11.6)
RBC # BLD AUTO: 3.31 MIL/UL (ref 4.5–6.2)
SODIUM SERPL-SCNC: 143 MMOL/L (ref 136–145)
WBC # BLD AUTO: 6.5 K/UL (ref 4.8–10.8)

## 2019-09-29 PROCEDURE — 0W9B30Z DRAINAGE OF LEFT PLEURAL CAVITY WITH DRAINAGE DEVICE, PERCUTANEOUS APPROACH: ICD-10-PCS | Performed by: INTERNAL MEDICINE

## 2019-09-29 RX ADMIN — SODIUM CHLORIDE SCH GM: 9 INJECTION, SOLUTION INTRAVENOUS at 05:10

## 2019-09-29 RX ADMIN — CARVEDILOL SCH MG: 3.12 TABLET, FILM COATED ORAL at 07:48

## 2019-09-29 RX ADMIN — CARVEDILOL SCH MG: 3.12 TABLET, FILM COATED ORAL at 21:00

## 2019-09-29 RX ADMIN — APIXABAN SCH MG: 5 TABLET, FILM COATED ORAL at 20:28

## 2019-09-29 RX ADMIN — FAMOTIDINE SCH MG: 20 TABLET, FILM COATED ORAL at 07:50

## 2019-09-29 RX ADMIN — IPRATROPIUM BROMIDE AND ALBUTEROL SULFATE SCH UDVIAL: .5; 3 SOLUTION RESPIRATORY (INHALATION) at 23:10

## 2019-09-29 RX ADMIN — APIXABAN SCH MG: 5 TABLET, FILM COATED ORAL at 07:50

## 2019-09-29 RX ADMIN — ATORVASTATIN CALCIUM SCH MG: 20 TABLET, FILM COATED ORAL at 20:27

## 2019-09-29 RX ADMIN — FAMOTIDINE SCH MG: 20 TABLET, FILM COATED ORAL at 20:28

## 2019-09-29 RX ADMIN — IPRATROPIUM BROMIDE AND ALBUTEROL SULFATE SCH UDVIAL: .5; 3 SOLUTION RESPIRATORY (INHALATION) at 11:11

## 2019-09-29 RX ADMIN — IPRATROPIUM BROMIDE AND ALBUTEROL SULFATE SCH UDVIAL: .5; 3 SOLUTION RESPIRATORY (INHALATION) at 06:14

## 2019-09-29 RX ADMIN — Medication SCH TAB: at 07:50

## 2019-09-29 RX ADMIN — IPRATROPIUM BROMIDE AND ALBUTEROL SULFATE SCH UDVIAL: .5; 3 SOLUTION RESPIRATORY (INHALATION) at 18:16

## 2019-09-29 RX ADMIN — CHLORTHALIDONE SCH EACH: 50 TABLET ORAL at 21:00

## 2019-09-29 RX ADMIN — CHLORTHALIDONE SCH EACH: 50 TABLET ORAL at 07:50

## 2019-09-29 NOTE — NUR
STATUS

RESTING IN BED. NO COMPLAINTS DENIES PAIN. CURRENTLY RECEIVING NEB. CALL LIGHT WITHIN REACH.

## 2019-09-29 NOTE — NUR
RETURNED FROM RADIOLOGY

NO CHEST TUBE PLACED. PER RAD STAFF, NO CHEST TUBE NEEDED. BENCHMARK STAFF MADE AWARE.

## 2019-09-29 NOTE — NUR
ASSESSMENT

PT IS AAOX4 DENIES CP DENIES SOB DENIES NV NO COMPLAINTS. RESTING IN BED. CALL LIGHT WITHIN 
REACH. NPO STATUS FOR PIGTAIL CATH.

## 2019-09-29 NOTE — NUR
U/S GD LEFT PIGTAIL TAIL CATHETER PLACEMENT CHEST TUBE. NOT DONE

CHEST CT SCAN DONE BY KATRINA CT TECH. DR. CRANDALL VIEWED IMAGES AND NOTED PATIENT 
DOES NOT HAVE A PNEUMOTHORAX. INFORMED PT OF RESULTS. CALLED REPORT TO 2ND FLOOR NURSE 
LISBETH BENITEZ. PT TRANSFERRED TO ROOM 204 VIA BED, STABLE,  AAO X3 WITH NO C/O PAIN. 
INFORMED CONCERNED FAMILY AT THE BEDSIDE OF NO PNEUMOTHORAX SEEN AND NO NEED FOR A CHEST 
TUBE TO BE PLACED. FAMILY VERBALIZED UNDERSTANDING.

## 2019-09-29 NOTE — NUR
PATIENT GOING FOR PIGTAIL TOMORROW.NPO SINCE MIDNIGHT. NO C/O SOB OR CHEST PAIN.



 LISACA ROUNDED ON PATIENT 1945.

## 2019-09-30 VITALS — SYSTOLIC BLOOD PRESSURE: 99 MMHG | DIASTOLIC BLOOD PRESSURE: 48 MMHG

## 2019-09-30 VITALS — SYSTOLIC BLOOD PRESSURE: 114 MMHG | DIASTOLIC BLOOD PRESSURE: 69 MMHG

## 2019-09-30 VITALS — DIASTOLIC BLOOD PRESSURE: 49 MMHG | SYSTOLIC BLOOD PRESSURE: 100 MMHG

## 2019-09-30 VITALS — DIASTOLIC BLOOD PRESSURE: 51 MMHG | SYSTOLIC BLOOD PRESSURE: 104 MMHG

## 2019-09-30 VITALS — SYSTOLIC BLOOD PRESSURE: 111 MMHG | DIASTOLIC BLOOD PRESSURE: 55 MMHG

## 2019-09-30 RX ADMIN — IPRATROPIUM BROMIDE AND ALBUTEROL SULFATE SCH UDVIAL: .5; 3 SOLUTION RESPIRATORY (INHALATION) at 06:28

## 2019-09-30 RX ADMIN — SODIUM CHLORIDE SCH GM: 9 INJECTION, SOLUTION INTRAVENOUS at 04:49

## 2019-09-30 RX ADMIN — Medication SCH TAB: at 10:08

## 2019-09-30 RX ADMIN — IPRATROPIUM BROMIDE AND ALBUTEROL SULFATE SCH UDVIAL: .5; 3 SOLUTION RESPIRATORY (INHALATION) at 11:07

## 2019-09-30 RX ADMIN — CHLORTHALIDONE SCH EACH: 50 TABLET ORAL at 10:09

## 2019-09-30 RX ADMIN — CARVEDILOL SCH MG: 3.12 TABLET, FILM COATED ORAL at 10:09

## 2019-09-30 RX ADMIN — FAMOTIDINE SCH MG: 20 TABLET, FILM COATED ORAL at 10:08

## 2019-09-30 NOTE — NUR
PATIENT RESTING IN BED. HEART RATE AFIB. REACHING UP , NON SUSTAINING. NON 
SYMPTOMATIC. COREG NOT GIVEN, SBP LOW 90S.